# Patient Record
Sex: MALE | Race: WHITE | Employment: FULL TIME | URBAN - METROPOLITAN AREA
[De-identification: names, ages, dates, MRNs, and addresses within clinical notes are randomized per-mention and may not be internally consistent; named-entity substitution may affect disease eponyms.]

---

## 2018-03-22 ENCOUNTER — HOSPITAL ENCOUNTER (OUTPATIENT)
Dept: OTHER | Age: 23
Discharge: HOME | End: 2018-03-22
Attending: SURGERY | Admitting: SURGERY

## 2018-03-22 ENCOUNTER — OFFICE VISIT (OUTPATIENT)
Dept: SURGERY | Age: 23
End: 2018-03-22

## 2018-03-22 VITALS
HEIGHT: 70 IN | HEART RATE: 70 BPM | DIASTOLIC BLOOD PRESSURE: 72 MMHG | BODY MASS INDEX: 30.06 KG/M2 | SYSTOLIC BLOOD PRESSURE: 116 MMHG | WEIGHT: 210 LBS

## 2018-03-22 DIAGNOSIS — L05.01 PILONIDAL CYST WITH ABSCESS: Primary | ICD-10-CM

## 2018-03-22 PROCEDURE — 10080 I&D PILONIDAL CYST SIMPLE: CPT | Performed by: SURGERY

## 2018-03-22 RX ORDER — SULFAMETHOXAZOLE AND TRIMETHOPRIM 800; 160 MG/1; MG/1
1 TABLET ORAL 2 TIMES DAILY
COMMUNITY
Start: 2018-03-20 | End: 2019-10-08

## 2018-03-22 NOTE — PROGRESS NOTES
SUBJECTIVE:  HPI:   Abscess: Patient presents for evaluation of a cutaneous abscess. Lesion is located in the perirectal region. Onset was 2 weeks ago. Symptoms have gradually worsened. Abscess has associated symptoms of spontaneous drainage. Patient does have previous history of cutaneous abscesses. Pt does not have previous history of MRSA. States that is burst last night 3/21/18. He visited ER on 3/19/18, Started on Bactrim 3/20/18. CT obtained  CT:  Impression   1. Abscess seen measuring 2 cm in diameter with surrounding inflammatory   changes in the posterior soft tissues of the perineum posterior to the anus. No fistula demonstrated   2. Otherwise, unremarkable CT of the abdomen and pelvis             Thoroughly reviewed the patient's medical history, family history, social history and review of systems with the patient today in the office. Please see medical record for pertinent positives. Past Medical History:   Diagnosis Date    Asthma     Hypospadias       Past Surgical History:   Procedure Laterality Date    HYPOSPADIAS CORRECTION       Current Outpatient Prescriptions   Medication Sig Dispense Refill    sulfamethoxazole-trimethoprim (BACTRIM DS) 800-160 MG per tablet Take 1 tablet by mouth 2 times daily      clindamycin (CLEOCIN) 150 MG capsule Take 3 capsules by mouth 3 times daily for 10 days 90 capsule 0    polyethylene glycol (MIRALAX) packet Take 17 g by mouth daily as needed for Other (Constipation) 527 g 1    Probiotic CAPS Take 1 capsule by mouth every 12 hours as needed (antibiotic use) 30 capsule 0    albuterol-ipratropium (COMBIVENT)  MCG/ACT inhaler Inhale 2 puffs into the lungs every 6 hours as needed.  naproxen (NAPROSYN) 500 MG tablet Take 1 tablet by mouth 2 times daily for 10 days 20 tablet 0     No current facility-administered medications for this visit.         Allergies   Allergen Reactions    Promethazine-Phenylephrine Rash and Other (See Comments) Pt unaware of reaction to med; pts mother told him he is allergic. Review of Systems:       Review of Systems   Constitutional: Negative for chills and fever. HENT: Negative for ear pain, mouth sores, sore throat and tinnitus. Eyes: Negative for photophobia, redness and itching. Respiratory: Negative for apnea, choking and stridor. Cardiovascular: Negative for chest pain and palpitations. Gastrointestinal: Negative for anal bleeding, constipation and rectal pain. Endocrine: Negative for polydipsia. Genitourinary: Negative for enuresis, flank pain and hematuria. Musculoskeletal: Negative for back pain, joint swelling and myalgias. Skin: Negative for color change and pallor. Allergic/Immunologic: Negative for environmental allergies. Neurological: Negative for syncope and speech difficulty. Psychiatric/Behavioral: Negative for confusion and hallucinations. OBJECTIVE:  Physical Exam:    /72 (Site: Right Arm, Position: Standing, Cuff Size: Medium Adult)   Pulse 70   Ht 5' 10\" (1.778 m)   Wt 210 lb (95.3 kg)   BMI 30.13 kg/m²      Physical Exam   Constitutional: He is oriented to person, place, and time. He appears well-developed and well-nourished. HENT:   Head: Normocephalic. Eyes: Pupils are equal, round, and reactive to light. Neck: Normal range of motion. Neck supple. Cardiovascular: Normal rate. Pulmonary/Chest: Effort normal.   Abdominal: Soft. He exhibits no distension and no mass. There is no tenderness. There is no rebound and no guarding. Musculoskeletal: Normal range of motion. Back:    Neurological: He is alert and oriented to person, place, and time. Skin: Skin is warm. Psychiatric: He has a normal mood and affect. ASSESSMENT:  1. Pilonidal cyst with abscess          PLAN:  Treatment: Patient counseled on risks, benefits, and alternatives of treatment plan at length while in the office today.  Patient states an understanding and willingness to proceed with plan. Incision and Drainage Procedure Note    Pre-op Dx: Abscess of coccyx. Post-op Dx: Same   Procedure: Incision and drainage of abscess of coccyx. Surgeon: Amanda Donovan MD  Anesthesia: Lidocaine 1% without epinephrine  Complications: None   Indications: See note     Informed verbal consent obtained. Local anesthetic with 1% lidocaine. Abscess incised with No. 11 blade. Copious pus and caseous material removed. Cavity irrigated with betadine. Packed using 1/4\" iodoform gauze. Covered with dry dressing. Culture sent to lab. Wound care instructed. Patient to pack abscess cavity daily with 1/4\" iodoform gauze and cover with dry gauze dressing. No orders of the defined types were placed in this encounter. No orders of the defined types were placed in this encounter. Follow Up:  No Follow-up on file.       Amanda Donovan MD      3/22/18

## 2018-03-23 ASSESSMENT — ENCOUNTER SYMPTOMS
COLOR CHANGE: 0
SORE THROAT: 0
PHOTOPHOBIA: 0
EYE REDNESS: 0
BACK PAIN: 0
APNEA: 0
EYE ITCHING: 0
RECTAL PAIN: 0
STRIDOR: 0
CONSTIPATION: 0
CHOKING: 0
ANAL BLEEDING: 0

## 2018-03-31 LAB
CULTURE: NORMAL
REPORT STATUS: NORMAL
REQUEST PROBLEM: NORMAL
SPECIMEN: NORMAL

## 2019-05-26 ENCOUNTER — HOSPITAL ENCOUNTER (EMERGENCY)
Age: 24
Discharge: HOME | End: 2019-05-26
Attending: EMERGENCY MEDICINE
Payer: COMMERCIAL

## 2019-05-26 ENCOUNTER — APPOINTMENT (OUTPATIENT)
Dept: CT IMAGING | Age: 24
End: 2019-05-26
Payer: COMMERCIAL

## 2019-05-26 VITALS
OXYGEN SATURATION: 97 % | HEIGHT: 70 IN | DIASTOLIC BLOOD PRESSURE: 83 MMHG | WEIGHT: 210 LBS | RESPIRATION RATE: 18 BRPM | TEMPERATURE: 97.5 F | BODY MASS INDEX: 30.06 KG/M2 | SYSTOLIC BLOOD PRESSURE: 149 MMHG | HEART RATE: 112 BPM

## 2019-05-26 DIAGNOSIS — S01.511A LACERATION OF LOWER LIP, INITIAL ENCOUNTER: ICD-10-CM

## 2019-05-26 DIAGNOSIS — S02.2XXA CLOSED FRACTURE OF NASAL BONE, INITIAL ENCOUNTER: Primary | ICD-10-CM

## 2019-05-26 DIAGNOSIS — S05.11XA PERIORBITAL CONTUSION OF RIGHT EYE, INITIAL ENCOUNTER: ICD-10-CM

## 2019-05-26 DIAGNOSIS — S09.90XA CLOSED HEAD INJURY, INITIAL ENCOUNTER: ICD-10-CM

## 2019-05-26 PROCEDURE — 70450 CT HEAD/BRAIN W/O DYE: CPT

## 2019-05-26 PROCEDURE — 4500000027

## 2019-05-26 PROCEDURE — 72125 CT NECK SPINE W/O DYE: CPT

## 2019-05-26 PROCEDURE — 99284 EMERGENCY DEPT VISIT MOD MDM: CPT

## 2019-05-26 PROCEDURE — 2500000003 HC RX 250 WO HCPCS: Performed by: EMERGENCY MEDICINE

## 2019-05-26 PROCEDURE — 70486 CT MAXILLOFACIAL W/O DYE: CPT

## 2019-05-26 PROCEDURE — 6370000000 HC RX 637 (ALT 250 FOR IP): Performed by: EMERGENCY MEDICINE

## 2019-05-26 RX ORDER — PENICILLIN V POTASSIUM 500 MG/1
500 TABLET ORAL 4 TIMES DAILY
Qty: 40 TABLET | Refills: 0 | Status: SHIPPED | OUTPATIENT
Start: 2019-05-26 | End: 2019-06-05

## 2019-05-26 RX ORDER — ACETAMINOPHEN 500 MG
1000 TABLET ORAL ONCE
Status: COMPLETED | OUTPATIENT
Start: 2019-05-26 | End: 2019-05-26

## 2019-05-26 RX ORDER — ONDANSETRON 4 MG/1
4 TABLET, FILM COATED ORAL EVERY 8 HOURS PRN
Qty: 10 TABLET | Refills: 0 | Status: SHIPPED | OUTPATIENT
Start: 2019-05-26 | End: 2019-10-08

## 2019-05-26 RX ORDER — ONDANSETRON 4 MG/1
4 TABLET, ORALLY DISINTEGRATING ORAL ONCE
Status: COMPLETED | OUTPATIENT
Start: 2019-05-26 | End: 2019-05-26

## 2019-05-26 RX ADMIN — ACETAMINOPHEN 1000 MG: 500 TABLET ORAL at 07:14

## 2019-05-26 RX ADMIN — LIDOCAINE HYDROCHLORIDE 5 ML: 10 INJECTION, SOLUTION EPIDURAL; INFILTRATION; INTRACAUDAL; PERINEURAL at 06:11

## 2019-05-26 RX ADMIN — ONDANSETRON 4 MG: 4 TABLET, ORALLY DISINTEGRATING ORAL at 07:13

## 2019-05-26 ASSESSMENT — PAIN SCALES - GENERAL: PAINLEVEL_OUTOF10: 10
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
## 2019-05-26 NOTE — ED NOTES
Discharge education reviewed. Questions answered. Medications clarified. Belongings gathered. Discharge instructions signed. All belongings accounted for. Patient discharged to home via mother.       Hattie Martinez RN  05/26/19 5631

## 2019-10-08 ENCOUNTER — APPOINTMENT (OUTPATIENT)
Dept: GENERAL RADIOLOGY | Age: 24
End: 2019-10-08
Payer: COMMERCIAL

## 2019-10-08 ENCOUNTER — HOSPITAL ENCOUNTER (EMERGENCY)
Age: 24
Discharge: HOME | End: 2019-10-08
Attending: FAMILY MEDICINE
Payer: COMMERCIAL

## 2019-10-08 VITALS
DIASTOLIC BLOOD PRESSURE: 88 MMHG | HEIGHT: 70 IN | BODY MASS INDEX: 35.22 KG/M2 | TEMPERATURE: 98.3 F | WEIGHT: 246 LBS | SYSTOLIC BLOOD PRESSURE: 142 MMHG | OXYGEN SATURATION: 98 % | HEART RATE: 108 BPM | RESPIRATION RATE: 18 BRPM

## 2019-10-08 DIAGNOSIS — J06.9 VIRAL URI: ICD-10-CM

## 2019-10-08 DIAGNOSIS — J45.21 MILD INTERMITTENT REACTIVE AIRWAY DISEASE WITH ACUTE EXACERBATION: Primary | ICD-10-CM

## 2019-10-08 LAB
ALBUMIN SERPL-MCNC: 4.5 GM/DL (ref 3.4–5)
ALP BLD-CCNC: 101 IU/L (ref 40–128)
ALT SERPL-CCNC: 29 U/L (ref 10–40)
ANION GAP SERPL CALCULATED.3IONS-SCNC: 12 MMOL/L (ref 4–16)
AST SERPL-CCNC: 19 IU/L (ref 15–37)
BASOPHILS ABSOLUTE: 0 K/CU MM
BASOPHILS RELATIVE PERCENT: 0.4 % (ref 0–1)
BILIRUB SERPL-MCNC: 0.3 MG/DL (ref 0–1)
BUN BLDV-MCNC: 12 MG/DL (ref 6–23)
CALCIUM SERPL-MCNC: 9.2 MG/DL (ref 8.3–10.6)
CHLORIDE BLD-SCNC: 102 MMOL/L (ref 99–110)
CO2: 25 MMOL/L (ref 21–32)
CREAT SERPL-MCNC: 0.8 MG/DL (ref 0.9–1.3)
DIFFERENTIAL TYPE: ABNORMAL
EOSINOPHILS ABSOLUTE: 0.3 K/CU MM
EOSINOPHILS RELATIVE PERCENT: 3.1 % (ref 0–3)
GFR AFRICAN AMERICAN: >60 ML/MIN/1.73M2
GFR NON-AFRICAN AMERICAN: >60 ML/MIN/1.73M2
GLUCOSE BLD-MCNC: 104 MG/DL (ref 70–99)
HCT VFR BLD CALC: 43.8 % (ref 42–52)
HEMOGLOBIN: 14.3 GM/DL (ref 13.5–18)
IMMATURE NEUTROPHIL %: 0.3 % (ref 0–0.43)
LYMPHOCYTES ABSOLUTE: 2.9 K/CU MM
LYMPHOCYTES RELATIVE PERCENT: 26.1 % (ref 24–44)
MCH RBC QN AUTO: 31.2 PG (ref 27–31)
MCHC RBC AUTO-ENTMCNC: 32.6 % (ref 32–36)
MCV RBC AUTO: 95.6 FL (ref 78–100)
MONOCYTES ABSOLUTE: 1 K/CU MM
MONOCYTES RELATIVE PERCENT: 9.4 % (ref 0–4)
NUCLEATED RBC %: 0 %
PDW BLD-RTO: 12.6 % (ref 11.7–14.9)
PLATELET # BLD: 173 K/CU MM (ref 140–440)
PMV BLD AUTO: 10.6 FL (ref 7.5–11.1)
POTASSIUM SERPL-SCNC: 3.2 MMOL/L (ref 3.5–5.1)
RBC # BLD: 4.58 M/CU MM (ref 4.6–6.2)
SEGMENTED NEUTROPHILS ABSOLUTE COUNT: 6.7 K/CU MM
SEGMENTED NEUTROPHILS RELATIVE PERCENT: 60.7 % (ref 36–66)
SODIUM BLD-SCNC: 139 MMOL/L (ref 135–145)
TOTAL IMMATURE NEUTOROPHIL: 0.03 K/CU MM
TOTAL NUCLEATED RBC: 0 K/CU MM
TOTAL PROTEIN: 7.6 GM/DL (ref 6.4–8.2)
TROPONIN T: <0.01 NG/ML
WBC # BLD: 11 K/CU MM (ref 4–10.5)

## 2019-10-08 PROCEDURE — 93005 ELECTROCARDIOGRAM TRACING: CPT | Performed by: FAMILY MEDICINE

## 2019-10-08 PROCEDURE — 85025 COMPLETE CBC W/AUTO DIFF WBC: CPT

## 2019-10-08 PROCEDURE — 6370000000 HC RX 637 (ALT 250 FOR IP): Performed by: FAMILY MEDICINE

## 2019-10-08 PROCEDURE — 71046 X-RAY EXAM CHEST 2 VIEWS: CPT

## 2019-10-08 PROCEDURE — 6360000002 HC RX W HCPCS: Performed by: FAMILY MEDICINE

## 2019-10-08 PROCEDURE — 96366 THER/PROPH/DIAG IV INF ADDON: CPT

## 2019-10-08 PROCEDURE — 94640 AIRWAY INHALATION TREATMENT: CPT

## 2019-10-08 PROCEDURE — 93010 ELECTROCARDIOGRAM REPORT: CPT | Performed by: INTERNAL MEDICINE

## 2019-10-08 PROCEDURE — 96375 TX/PRO/DX INJ NEW DRUG ADDON: CPT

## 2019-10-08 PROCEDURE — 2580000003 HC RX 258: Performed by: FAMILY MEDICINE

## 2019-10-08 PROCEDURE — 99284 EMERGENCY DEPT VISIT MOD MDM: CPT

## 2019-10-08 PROCEDURE — 84484 ASSAY OF TROPONIN QUANT: CPT

## 2019-10-08 PROCEDURE — 96365 THER/PROPH/DIAG IV INF INIT: CPT

## 2019-10-08 PROCEDURE — 80053 COMPREHEN METABOLIC PANEL: CPT

## 2019-10-08 RX ORDER — NAPROXEN 500 MG/1
500 TABLET ORAL 2 TIMES DAILY
Qty: 20 TABLET | Refills: 0 | Status: SHIPPED | OUTPATIENT
Start: 2019-10-08 | End: 2019-10-18

## 2019-10-08 RX ORDER — PREDNISONE 20 MG/1
40 TABLET ORAL DAILY
Qty: 10 TABLET | Refills: 0 | Status: SHIPPED | OUTPATIENT
Start: 2019-10-08 | End: 2019-10-13

## 2019-10-08 RX ORDER — MAGNESIUM SULFATE IN WATER 40 MG/ML
2 INJECTION, SOLUTION INTRAVENOUS ONCE
Status: COMPLETED | OUTPATIENT
Start: 2019-10-08 | End: 2019-10-08

## 2019-10-08 RX ORDER — DEXAMETHASONE SODIUM PHOSPHATE 10 MG/ML
10 INJECTION, SOLUTION INTRAMUSCULAR; INTRAVENOUS ONCE
Status: COMPLETED | OUTPATIENT
Start: 2019-10-08 | End: 2019-10-08

## 2019-10-08 RX ORDER — 0.9 % SODIUM CHLORIDE 0.9 %
1000 INTRAVENOUS SOLUTION INTRAVENOUS ONCE
Status: COMPLETED | OUTPATIENT
Start: 2019-10-08 | End: 2019-10-08

## 2019-10-08 RX ORDER — ALBUTEROL SULFATE 90 UG/1
2 AEROSOL, METERED RESPIRATORY (INHALATION) EVERY 4 HOURS PRN
Qty: 1 INHALER | Refills: 0 | Status: SHIPPED | OUTPATIENT
Start: 2019-10-08 | End: 2019-10-15

## 2019-10-08 RX ORDER — IPRATROPIUM BROMIDE AND ALBUTEROL SULFATE 2.5; .5 MG/3ML; MG/3ML
3 SOLUTION RESPIRATORY (INHALATION) ONCE
Status: COMPLETED | OUTPATIENT
Start: 2019-10-08 | End: 2019-10-08

## 2019-10-08 RX ORDER — OXYMETAZOLINE HYDROCHLORIDE 0.05 G/100ML
3 SPRAY NASAL ONCE
Status: COMPLETED | OUTPATIENT
Start: 2019-10-08 | End: 2019-10-08

## 2019-10-08 RX ADMIN — IPRATROPIUM BROMIDE AND ALBUTEROL SULFATE 3 AMPULE: .5; 3 SOLUTION RESPIRATORY (INHALATION) at 01:00

## 2019-10-08 RX ADMIN — DEXAMETHASONE SODIUM PHOSPHATE 10 MG: 10 INJECTION, SOLUTION INTRAMUSCULAR; INTRAVENOUS at 02:10

## 2019-10-08 RX ADMIN — SODIUM CHLORIDE 1000 ML: 9 INJECTION, SOLUTION INTRAVENOUS at 02:05

## 2019-10-08 RX ADMIN — OXYMETAZOLINE HCL 3 SPRAY: 0.05 SPRAY NASAL at 02:10

## 2019-10-08 RX ADMIN — MAGNESIUM SULFATE 2 G: 2 INJECTION INTRAVENOUS at 02:10

## 2019-10-08 ASSESSMENT — PAIN DESCRIPTION - PAIN TYPE: TYPE: ACUTE PAIN

## 2019-10-08 ASSESSMENT — PAIN SCALES - GENERAL: PAINLEVEL_OUTOF10: 7

## 2019-10-08 ASSESSMENT — PAIN DESCRIPTION - LOCATION: LOCATION: CHEST

## 2019-10-09 ENCOUNTER — HOSPITAL ENCOUNTER (EMERGENCY)
Age: 24
Discharge: HOME | End: 2019-10-09
Attending: EMERGENCY MEDICINE
Payer: COMMERCIAL

## 2019-10-09 VITALS
WEIGHT: 245 LBS | HEIGHT: 70 IN | OXYGEN SATURATION: 96 % | TEMPERATURE: 98.1 F | BODY MASS INDEX: 35.07 KG/M2 | HEART RATE: 88 BPM | DIASTOLIC BLOOD PRESSURE: 82 MMHG | SYSTOLIC BLOOD PRESSURE: 121 MMHG | RESPIRATION RATE: 16 BRPM

## 2019-10-09 DIAGNOSIS — R05.9 COUGH: Primary | ICD-10-CM

## 2019-10-09 PROCEDURE — 6370000000 HC RX 637 (ALT 250 FOR IP): Performed by: EMERGENCY MEDICINE

## 2019-10-09 PROCEDURE — 99283 EMERGENCY DEPT VISIT LOW MDM: CPT

## 2019-10-09 RX ORDER — BENZONATATE 100 MG/1
100 CAPSULE ORAL 3 TIMES DAILY PRN
Qty: 9 CAPSULE | Refills: 0 | Status: SHIPPED | OUTPATIENT
Start: 2019-10-09 | End: 2019-10-16

## 2019-10-09 RX ORDER — METHYLPREDNISOLONE 4 MG/1
TABLET ORAL
Qty: 1 KIT | Refills: 0 | Status: SHIPPED | OUTPATIENT
Start: 2019-10-09

## 2019-10-09 RX ORDER — DIPHENHYDRAMINE HCL 25 MG
25 CAPSULE ORAL ONCE
Status: COMPLETED | OUTPATIENT
Start: 2019-10-09 | End: 2019-10-09

## 2019-10-09 RX ORDER — PREDNISONE 20 MG/1
60 TABLET ORAL ONCE
Status: COMPLETED | OUTPATIENT
Start: 2019-10-09 | End: 2019-10-09

## 2019-10-09 RX ORDER — BENZONATATE 100 MG/1
100 CAPSULE ORAL ONCE
Status: COMPLETED | OUTPATIENT
Start: 2019-10-09 | End: 2019-10-09

## 2019-10-09 RX ORDER — BROMPHENIRAMINE MALEATE, PSEUDOEPHEDRINE HYDROCHLORIDE, AND DEXTROMETHORPHAN HYDROBROMIDE 2; 30; 10 MG/5ML; MG/5ML; MG/5ML
5 SYRUP ORAL 4 TIMES DAILY PRN
Qty: 1 BOTTLE | Refills: 0 | Status: SHIPPED | OUTPATIENT
Start: 2019-10-09

## 2019-10-09 RX ADMIN — BENZONATATE 100 MG: 100 CAPSULE ORAL at 23:31

## 2019-10-09 RX ADMIN — DIPHENHYDRAMINE HYDROCHLORIDE 25 MG: 25 CAPSULE ORAL at 23:31

## 2019-10-09 RX ADMIN — PREDNISONE 60 MG: 20 TABLET ORAL at 23:31

## 2019-10-09 ASSESSMENT — ENCOUNTER SYMPTOMS
SHORTNESS OF BREATH: 0
RHINORRHEA: 1
SINUS CONGESTION: 1
GASTROINTESTINAL NEGATIVE: 1
WHEEZING: 0
COUGH: 1
EYES NEGATIVE: 1

## 2019-10-09 ASSESSMENT — PAIN SCALES - GENERAL: PAINLEVEL_OUTOF10: 7

## 2019-10-09 ASSESSMENT — PAIN DESCRIPTION - LOCATION: LOCATION: CHEST

## 2019-10-09 ASSESSMENT — PAIN DESCRIPTION - PAIN TYPE: TYPE: ACUTE PAIN

## 2019-10-11 LAB
EKG ATRIAL RATE: 101 BPM
EKG DIAGNOSIS: NORMAL
EKG P AXIS: 51 DEGREES
EKG P-R INTERVAL: 180 MS
EKG Q-T INTERVAL: 344 MS
EKG QRS DURATION: 92 MS
EKG QTC CALCULATION (BAZETT): 446 MS
EKG R AXIS: 68 DEGREES
EKG T AXIS: 15 DEGREES
EKG VENTRICULAR RATE: 101 BPM

## 2020-10-15 ENCOUNTER — APPOINTMENT (OUTPATIENT)
Dept: GENERAL RADIOLOGY | Age: 25
End: 2020-10-15

## 2020-10-15 ENCOUNTER — HOSPITAL ENCOUNTER (EMERGENCY)
Age: 25
Discharge: LEFT BEFORE DISCHARGE | End: 2020-10-15

## 2020-10-15 VITALS
BODY MASS INDEX: 32.93 KG/M2 | SYSTOLIC BLOOD PRESSURE: 113 MMHG | DIASTOLIC BLOOD PRESSURE: 91 MMHG | RESPIRATION RATE: 16 BRPM | HEART RATE: 70 BPM | OXYGEN SATURATION: 100 % | WEIGHT: 230 LBS | HEIGHT: 70 IN | TEMPERATURE: 97.7 F

## 2020-10-15 PROCEDURE — 71046 X-RAY EXAM CHEST 2 VIEWS: CPT

## 2020-10-15 PROCEDURE — 99281 EMR DPT VST MAYX REQ PHY/QHP: CPT

## 2020-10-15 RX ORDER — ALBUTEROL SULFATE 0.63 MG/3ML
1 SOLUTION RESPIRATORY (INHALATION) EVERY 6 HOURS PRN
COMMUNITY

## 2020-10-15 RX ORDER — GUAIFENESIN 100 MG/5ML
200 SOLUTION ORAL EVERY 4 HOURS PRN
Status: DISCONTINUED | OUTPATIENT
Start: 2020-10-15 | End: 2020-10-15 | Stop reason: HOSPADM

## 2020-10-15 RX ORDER — PREDNISONE 20 MG/1
60 TABLET ORAL ONCE
Status: DISCONTINUED | OUTPATIENT
Start: 2020-10-15 | End: 2020-10-15 | Stop reason: HOSPADM

## 2020-10-15 RX ORDER — AMOXICILLIN 250 MG/1
250 CAPSULE ORAL 3 TIMES DAILY
COMMUNITY

## 2020-10-15 ASSESSMENT — PAIN DESCRIPTION - DESCRIPTORS: DESCRIPTORS: DISCOMFORT

## 2020-10-15 ASSESSMENT — PAIN DESCRIPTION - LOCATION: LOCATION: CHEST

## 2020-10-15 ASSESSMENT — PAIN DESCRIPTION - PAIN TYPE: TYPE: ACUTE PAIN

## 2020-10-15 ASSESSMENT — PAIN SCALES - GENERAL: PAINLEVEL_OUTOF10: 6

## 2020-10-15 NOTE — ED TRIAGE NOTES
Pt states \"he woke up not being able to breath. Feels short of breath with constant pressure in his chest\". Pain is 5/10. Pt states that he took two breathing treatments this morning when he woke up. Used albuterol inhaler 4 times.

## 2020-10-15 NOTE — ED PROVIDER NOTES
As physician-in-triage, I performed a medical screening history and physical exam on this patient. HISTORY OF PRESENT ILLNESS  James Rider is a 31 y.o. male presents to the emergency department shortness of breath and cough since this morning. States is a nonproductive cough. States he has a tightness in his chest.  States he is a smoker and has a history of asthma. Uses albuterol inhaler and nebulizer. States he is in this morning without relief. No known Covid exposure. No fevers or chills. No diaphoresis. No syncope. No known cardiac disease. PHYSICAL EXAM  BP (!) 113/91   Pulse 70   Temp 97.7 °F (36.5 °C) (Oral)   Resp 16   Ht 5' 10\" (1.778 m)   Wt 230 lb (104.3 kg)   SpO2 100%   BMI 33.00 kg/m²     On exam, the patient appears in no acute distress. Speech is clear. Breathing is unlabored. Moves all extremities    Comment: Please note this report has been produced using speech recognition software and may contain errors related to that system including errors in grammar, punctuation, and spelling, as well as words and phrases that may be inappropriate. If there are any questions or concerns please feel free to contact the dictating provider for clarification.        Radha Atkinson MD  10/15/20 9742

## 2021-03-30 ENCOUNTER — APPOINTMENT (OUTPATIENT)
Dept: GENERAL RADIOLOGY | Age: 26
End: 2021-03-30

## 2021-03-30 ENCOUNTER — HOSPITAL ENCOUNTER (EMERGENCY)
Age: 26
Discharge: HOME | End: 2021-03-30

## 2021-03-30 VITALS
HEART RATE: 79 BPM | SYSTOLIC BLOOD PRESSURE: 152 MMHG | OXYGEN SATURATION: 99 % | RESPIRATION RATE: 17 BRPM | DIASTOLIC BLOOD PRESSURE: 72 MMHG | BODY MASS INDEX: 32.93 KG/M2 | TEMPERATURE: 97.7 F | WEIGHT: 230 LBS | HEIGHT: 70 IN

## 2021-03-30 DIAGNOSIS — R05.9 COUGH: Primary | ICD-10-CM

## 2021-03-30 DIAGNOSIS — M79.671 ACUTE FOOT PAIN, RIGHT: ICD-10-CM

## 2021-03-30 PROCEDURE — 99283 EMERGENCY DEPT VISIT LOW MDM: CPT

## 2021-03-30 PROCEDURE — 73630 X-RAY EXAM OF FOOT: CPT

## 2021-03-30 PROCEDURE — U0005 INFEC AGEN DETEC AMPLI PROBE: HCPCS

## 2021-03-30 PROCEDURE — U0003 INFECTIOUS AGENT DETECTION BY NUCLEIC ACID (DNA OR RNA); SEVERE ACUTE RESPIRATORY SYNDROME CORONAVIRUS 2 (SARS-COV-2) (CORONAVIRUS DISEASE [COVID-19]), AMPLIFIED PROBE TECHNIQUE, MAKING USE OF HIGH THROUGHPUT TECHNOLOGIES AS DESCRIBED BY CMS-2020-01-R: HCPCS

## 2021-03-30 PROCEDURE — 71046 X-RAY EXAM CHEST 2 VIEWS: CPT

## 2021-03-30 RX ORDER — ALBUTEROL SULFATE 90 UG/1
2 AEROSOL, METERED RESPIRATORY (INHALATION) EVERY 6 HOURS PRN
Qty: 1 INHALER | Refills: 0 | Status: SHIPPED | OUTPATIENT
Start: 2021-03-30

## 2021-03-30 NOTE — LETTER
[facility] Emergency Department  [address] [address]  Phone: [phone]  Fax: [phone]               March 30, 2021    Patient: Lillian Mallory   YOB: 1995   Date of Visit: 3/30/2021       To Whom It May Concern:    Iman López was seen and treated in our emergency department on 3/30/2021. He may return to work pending COVID-19 test.  If test is negative he may return to work immediately. If test is positive- he is not return to work until symptoms have improved, he is without fever for 24 hours without usage of ibuprofen or acetaminophen, and it must be after April 5, 2021.     Sincerely,       Cristobal Medina PA-C         Signature:__________________________________

## 2021-03-31 ENCOUNTER — CARE COORDINATION (OUTPATIENT)
Dept: CARE COORDINATION | Age: 26
End: 2021-03-31

## 2021-03-31 LAB
SARS-COV-2: NOT DETECTED
SOURCE: NORMAL

## 2021-03-31 NOTE — CARE COORDINATION
[facility] department [address]: ([phone]) and PCP office for questions related to their healthcare. LPN CC provided contact information for future needs. Reviewed and educated patient on any new and changed medications related to discharge diagnosis     Was patient discharged with a pulse oximeter? No Discussed and confirmed pulse oximeter discharge instructions and when to notify provider or seek emergency care. Patient/family/caregiver given information for [employer] and agrees to enroll no  Patient's preferred e-mail: na   Patient's preferred phone number: see chart  Based on Loop alert triggers, patient will be contacted by nurse care manager for worsening symptoms. Plan for follow-up call in 5-7 days based on severity of symptoms and risk factors.

## 2021-03-31 NOTE — ED PROVIDER NOTES
EMERGENCY DEPARTMENT ENCOUNTER      PCP: No primary care provider on file. CHIEF COMPLAINT    Chief Complaint   Patient presents with    Foot Pain     right       This patient was not evaluated by the attending physician. I have independently evaluated this patient. My supervising physician was available for consultation. HPI    James Pathak is a 31 y.o. male who presents with nonproductive cough for the past 4 days and \"knot\" on the bottom of his right foot that has been present for 2 to 3 months. Patient reports he came today because of his cough but wanted to have his foot checked out while he is here because he stepped on a nail a few months ago and removed it himself and attempted to be seen in the ED at that time but left due to wait times being long. Patient reports his cough started after he cleaned out a stagnant trailer that has mouse feces and lots of dust. He reports he has asthma and this usually triggers his asthma. He feels this is his asthma flaring up. He does report subjective fever last night and feels fatigued. Patient reports occasional wheezing and shortness of breath. He does have an albuterol inhaler to use at home but isn't sure how much is left of it as he does not use it regularly. Patient denies any known recent contacts that have tested positive for COVID-19 or recent sick contacts. Patient denies history of PE/DVT. Patient denies nasal congestion, rhinorrhea, sore throat, ear pain, eye pain, drainage from ears or eyes, difficulty swallowing, hemoptysis, chest pain. REVIEW OF SYSTEMS    Constitutional:  + fever  HEENT:  See HPI  Cardiovascular:  Denies chest pain, palpitations.   Respiratory:  See HPI  GI:  Denies abdominal pain, vomiting, or diarrhea   Neurologic:  Denies confusion, light headedness, dizziness, or syncope   Skin:  No rash  Musculoskeletal: + right foot pain; Denies back pain    All other review of systems are negative  See HPI and nursing notes for additional information       PAST MEDICAL AND SURGICAL HISTORY    Past Medical History:   Diagnosis Date    Asthma      History reviewed. No pertinent surgical history. CURRENT MEDICATIONS    Current Outpatient Rx   Medication Sig Dispense Refill    albuterol sulfate  (90 Base) MCG/ACT inhaler Inhale 2 puffs into the lungs every 6 hours as needed for Wheezing or Shortness of Breath (or cough) Please include spacer with instructions for use. 1 Inhaler 0    albuterol (ACCUNEB) 0.63 MG/3ML nebulizer solution Take 1 ampule by nebulization every 6 hours as needed for Wheezing      amoxicillin (AMOXIL) 250 MG capsule Take 250 mg by mouth 3 times daily         ALLERGIES    No Known Allergies    FAMILY AND SOCIAL HISTORY    History reviewed. No pertinent family history. Social History     Socioeconomic History    Marital status: Unknown     Spouse name: None    Number of children: None    Years of education: None    Highest education level: None   Occupational History    None   Social Needs    Financial resource strain: None    Food insecurity     Worry: None     Inability: None    Transportation needs     Medical: None     Non-medical: None   Tobacco Use    Smoking status: Current Every Day Smoker     Packs/day: 2.00     Years: 11.00     Pack years: 22.00     Types: Cigarettes    Smokeless tobacco: Never Used   Substance and Sexual Activity    Alcohol use:  Yes     Alcohol/week: 4.0 standard drinks     Types: 4 Cans of beer per week     Comment: per day     Drug use: Never    Sexual activity: Yes   Lifestyle    Physical activity     Days per week: None     Minutes per session: None    Stress: None   Relationships    Social connections     Talks on phone: None     Gets together: None     Attends Scientologist service: None     Active member of club or organization: None     Attends meetings of clubs or organizations: None     Relationship status: None    Intimate partner violence     Fear of current or ex partner: None     Emotionally abused: None     Physically abused: None     Forced sexual activity: None   Other Topics Concern    None   Social History Narrative    None       PHYSICAL EXAM    VITAL SIGNS: /86   Pulse 109   Temp 97.7 °F (36.5 °C) (Oral)   Resp 16   Ht 5' 10\" (1.778 m)   Wt 230 lb (104.3 kg)   SpO2 97%   BMI 33.00 kg/m²   Constitutional:  Well developed, well nourished, no acute distress, non-toxic appearance   Eyes: Conjunctiva normal, sclera non-icteric  HEENT:     - Normocephalic, atraumatic   - EOM intact. Conjunctiva normal    - External ears and nose normal.   - External auditory canals clear   - TMs clear without discharge, fluid, or bulging. Neck/Lymphatics:    - supple, no JVD     Respiratory:    Clear to auscultation in bilateral lung fields, no wheezes/rales/rhonchi. No retractions, no accessory muscle use  Cardiovascular:  Normal rate, normal rhythm   GI:  No distension  Musculoskeletal:  No obvious deficits, no edema. Right foot: Without gross deformity, discoloration. There is tenderness to palpation over callus formation of plantar aspect of foot between 3th and 4th metatarsal region with mild edema present. No other edema or TTP of right foot is present. No TTP of toes of right foot. Right ankle without gross deformity, swelling, discoloration, TTP. Full AROM without pain. Integument:  Skin pink, warm, dry, intact       LABS:  COVID test    RADIOLOGY/PROCEDURES    XR FOOT RIGHT (MIN 3 VIEWS)   Final Result   No acute osseous abnormality. XR CHEST (2 VW)   Final Result   No acute process. ED COURSE & MEDICAL DECISION MAKING       Vital signs and nursing notes reviewed during ED course. I have independently evaluated this patient. Supervising physician present in the Emergency Department, available for consultation, throughout entirety of  patient care.   All pertinent Lab data and radiographic results reviewed with patient at bedside. Patient presents as above which prompted work up today with COVID-19 test which is in process and xrays of chest and right foot. Chest xray obtained today and reveals no acute cardiopulmonary process. No pneumothorax, no consolidation concerning for pneumonia, no pleural effusion. Right foot xray without acute osseous abnormality. Suspect Doran's neuroma as cause of right foot pain and recommend changing to more padded new shoes and follow up with podiatry. Discussed likely viral etiology of cough/fever and recommended COVID-19 isolation precautions until test results- patient voices understanding. I did don/doff appropriate PPE (including N95 mask, safety glasses, gown, gloves), as recommended by the health facility/national standard best practice, during my bedside interactions with the patient. COVID19 testing in progress. I have explained to the patient that their condition may change rapidly and have given them strict return precautions. In addition, they are given instructions regarding appropriate symptomatic care at home and instructions on how to minimize spread of the infection. I prescribed patient albuterol inhaler- we discussed medication. Recommended ibuprofen for right foot pain. I have low clinical suspicion for cellulitis, abscess, occult pneumonia, PE. Patient is comfortable with discharge at this time. Patient is nontoxic appearing. Vital signs stable. Patient is stable for outpatient management. The patient and/or the family were informed of the results of any tests/labs/imaging, the treatment plan, and time was allotted to answer questions. Diagnosis, disposition, and plan discussed in detail with patient who understands and agrees. Patient understands and agrees to follow up with podiatrist for recheck in 2-3 days and with [facility] resource center for recheck in 2 days for cough.  Patient understands and agrees to return to emergency department if symptoms worsen or any new symptoms develop- strict return precautions given. Clinical  IMPRESSION    1. Cough    2. Acute foot pain, right           Disposition referral (if applicable):  Swapnil Ma, [address] [facility] [address] [phone]    Call today  Recheck in 2-3 days    [facility]  [address]  [address] [address]  [phone]  Call today  [address] in 2 days for cough    [facility] Emergency Department  [facility] [id number] [phone]  Go to   Return to ED if symptoms worsen or new symptoms      Disposition medications (if applicable):  New Prescriptions    ALBUTEROL SULFATE  (90 BASE) MCG/ACT INHALER    Inhale 2 puffs into the lungs every 6 hours as needed for Wheezing or Shortness of Breath (or cough) Please include spacer with instructions for use. Comment: Please note this report has been produced using speech recognition software and may contain errors related to that system including errors in grammar, punctuation, and spelling, as well as words and phrases that may be inappropriate. If there are any questions or concerns please feel free to contact the dictating provider for clarification.         Cristobal Medina PA-C  04/01/21 0869

## 2021-03-31 NOTE — CARE COORDINATION
LP[facility] Care Coordinator attempted outreach. Unable to leave message and contact information because voicemail box in not setup yet.

## 2021-03-31 NOTE — ED NOTES
Discharge instructions reviewed with pt. All questions answered at this time. Pt verbalized understanding.       Aravind Albert RN  03/30/21 1445

## 2021-04-08 ENCOUNTER — CARE COORDINATION (OUTPATIENT)
Dept: CARE COORDINATION | Age: 26
End: 2021-04-08

## 2021-04-08 NOTE — CARE COORDINATION
[facility] attempted 1st week outreach. Unable to leave message because voice mailbox is not setup.

## 2021-04-16 ENCOUNTER — CARE COORDINATION (OUTPATIENT)
Dept: CARE COORDINATION | Age: 26
End: 2021-04-16

## 2021-04-16 NOTE — CARE COORDINATION
You Patient resolved from the Care Transitions episode on 3/30/21  Discussed COVID-19 related testing which was available at this time. Test results were negative. Patient informed of results, if available? Yes    Patient/family has been provided the following resources and education related to COVID-19:                         Signs, symptoms and red flags related to COVID-19            [facility] [place] exposure and quarantine guidelines            [facility] exposure contact - [phone]            Contact for their local Department of Health                 Patient currently reports that the following symptoms have improved:  reports some allergy symptoms of running nose . Patient reports that he went a had a pedicure done and it helped the knot (callus) at bottom of right foot. Patient reports doing well. No further outreach scheduled with this CTN/ACM. Episode of Care resolved. Patient has this CTN/ACM contact information if future needs arise.

## 2022-06-27 ENCOUNTER — APPOINTMENT (OUTPATIENT)
Dept: GENERAL RADIOLOGY | Age: 27
End: 2022-06-27

## 2022-06-27 ENCOUNTER — HOSPITAL ENCOUNTER (EMERGENCY)
Age: 27
Discharge: HOME | End: 2022-06-27

## 2022-06-27 VITALS
HEIGHT: 71 IN | SYSTOLIC BLOOD PRESSURE: 116 MMHG | DIASTOLIC BLOOD PRESSURE: 94 MMHG | TEMPERATURE: 98.4 F | BODY MASS INDEX: 31.5 KG/M2 | HEART RATE: 63 BPM | WEIGHT: 225 LBS | RESPIRATION RATE: 18 BRPM | OXYGEN SATURATION: 96 %

## 2022-06-27 DIAGNOSIS — M54.12 CERVICAL RADICULOPATHY: Primary | ICD-10-CM

## 2022-06-27 DIAGNOSIS — M25.511 ACUTE PAIN OF RIGHT SHOULDER: ICD-10-CM

## 2022-06-27 PROCEDURE — 96372 THER/PROPH/DIAG INJ SC/IM: CPT

## 2022-06-27 PROCEDURE — 6360000002 HC RX W HCPCS: Performed by: PHYSICIAN ASSISTANT

## 2022-06-27 PROCEDURE — 72050 X-RAY EXAM NECK SPINE 4/5VWS: CPT

## 2022-06-27 PROCEDURE — 73030 X-RAY EXAM OF SHOULDER: CPT

## 2022-06-27 PROCEDURE — 99284 EMERGENCY DEPT VISIT MOD MDM: CPT

## 2022-06-27 RX ORDER — METHYLPREDNISOLONE 4 MG/1
TABLET ORAL
Qty: 21 TABLET | Refills: 0 | Status: SHIPPED | OUTPATIENT
Start: 2022-06-27

## 2022-06-27 RX ORDER — METHOCARBAMOL 500 MG/1
500 TABLET, FILM COATED ORAL 3 TIMES DAILY
Qty: 15 TABLET | Refills: 0 | Status: SHIPPED | OUTPATIENT
Start: 2022-06-27 | End: 2022-07-02

## 2022-06-27 RX ORDER — ORPHENADRINE CITRATE 30 MG/ML
60 INJECTION INTRAMUSCULAR; INTRAVENOUS ONCE
Status: COMPLETED | OUTPATIENT
Start: 2022-06-27 | End: 2022-06-27

## 2022-06-27 RX ORDER — KETOROLAC TROMETHAMINE 30 MG/ML
30 INJECTION, SOLUTION INTRAMUSCULAR; INTRAVENOUS ONCE
Status: COMPLETED | OUTPATIENT
Start: 2022-06-27 | End: 2022-06-27

## 2022-06-27 RX ADMIN — ORPHENADRINE CITRATE 60 MG: 30 INJECTION INTRAMUSCULAR; INTRAVENOUS at 09:49

## 2022-06-27 RX ADMIN — KETOROLAC TROMETHAMINE 30 MG: 30 INJECTION, SOLUTION INTRAMUSCULAR; INTRAVENOUS at 09:50

## 2022-06-27 ASSESSMENT — PAIN - FUNCTIONAL ASSESSMENT: PAIN_FUNCTIONAL_ASSESSMENT: 0-10

## 2022-06-27 ASSESSMENT — PAIN SCALES - GENERAL
PAINLEVEL_OUTOF10: 7
PAINLEVEL_OUTOF10: 3
PAINLEVEL_OUTOF10: 8

## 2022-06-27 ASSESSMENT — PAIN DESCRIPTION - ORIENTATION
ORIENTATION: RIGHT

## 2022-06-27 ASSESSMENT — PAIN DESCRIPTION - DESCRIPTORS
DESCRIPTORS: STABBING
DESCRIPTORS: TIGHTNESS;ACHING
DESCRIPTORS: ACHING;TIGHTNESS

## 2022-06-27 ASSESSMENT — PAIN DESCRIPTION - LOCATION
LOCATION: ARM
LOCATION: ARM;SHOULDER
LOCATION: ARM;SHOULDER

## 2022-06-27 ASSESSMENT — PAIN DESCRIPTION - FREQUENCY
FREQUENCY: CONTINUOUS
FREQUENCY: CONTINUOUS

## 2022-06-27 ASSESSMENT — PAIN DESCRIPTION - PAIN TYPE
TYPE: CHRONIC PAIN
TYPE: CHRONIC PAIN

## 2022-06-27 ASSESSMENT — PAIN DESCRIPTION - ONSET: ONSET: ON-GOING

## 2022-06-27 NOTE — LETTER
[facility] Emergency Department  [address] [address]  Phone: [phone]  Fax: [phone]               June 27, 2022    Patient: Marylou Sarabia   YOB: 1995   Date of Visit: 6/27/2022       To Whom It May Concern:    Rickey Citizen was seen and treated in our emergency department on 6/27/2022. He may return to work on June 29, 2022.       Sincerely,       Melissa Fernandez RN         Signature:__________________________________

## 2022-06-27 NOTE — ED PROVIDER NOTES
eMERGENCY dEPARTMENT eNCOUnter         [address] [facility] ([place])    PCP: No primary care provider on file. CHIEF COMPLAINT    Chief Complaint   Patient presents with    Arm Injury     patient states about 3 months ago injured right arm, but the pain is now worse the past 3 days, left arm numbness sense       HPI    Jennifer Roa is a 31 y.o. male who presents with right-sided neck and shoulder pain. Onset was prior to arrival, x3 days ago. Context is patient states that he works as a , does a lot of overhead repetitive movements. Approximately 3 months ago, he was holding a 40 pound impact drill in his right hand when his arm suddenly gave out. States that he has arm dropped while carrying the heavy weight. Initially attributed this to \"a busy day and my arm getting tired. \"  Since then, he has had progressively worsening right anterolateral shoulder pain which is now rating to the right lateral neck. Pain worsens with work activities, shoulder movement especially lifting arm above head. Also noted some decreased strength of the right hand with repetitive movements. Denying any pain or tingling radiating to the right hand however yesterday while mowing, does state that his left arm did feel more numb. Denying any arm numbness today. No associated chest pain shortness of breath, nausea or vomiting. No fever or chills. No history of IV drug use. Has not been seen over the last 3 months for ongoing symptoms. No history of IV drug use or diabetes. Pain is a constant throbbing sharp pain, 7/10 throughout the anterolateral right shoulder, worse with any movement or palpation of the shoulder. No underlying history for cervical degenerative disc disease or neck pain or known carpal tunnel syndrome of the right upper arm. Patient is right-handed. Denies any other numbness tingling or weakness to the face, other extremities, abdomen.     REVIEW OF SYSTEMS    Constitutional:  Denies fever, chills, weight loss or weakness. Denies history of IVDA. Cardiovascular:  Denies chest pain, palpitations or swelling  Respiratory:  Denies cough or shortness of breath    GI:  Denies abdominal pain, nausea, vomiting, or diarrhea  :  Denies any urinary symptom   Musculoskeletal:  See HPI above   Skin:  Denies rash  Neurologic: See HPI  Endocrine:  Denies polyuria or polydypsia   Lymphatic:  Denies swollen glands     All other review of systems are negative  See HPI and nursing notes for additional information       PAST MEDICAL & SURGICAL HISTORY    Past Medical History:   Diagnosis Date    Asthma      History reviewed. No pertinent surgical history. CURRENT MEDICATIONS    Current Outpatient Rx   Medication Sig Dispense Refill    methocarbamol (ROBAXIN) 500 MG tablet Take 1 tablet by mouth 3 times daily for 5 days 15 tablet 0    methylPREDNISolone (MEDROL, ANTOINETTE,) 4 MG tablet Take by mouth as directed on package 21 tablet 0    albuterol sulfate  (90 Base) MCG/ACT inhaler Inhale 2 puffs into the lungs every 6 hours as needed for Wheezing or Shortness of Breath (or cough) Please include spacer with instructions for use.  1 Inhaler 0    albuterol (ACCUNEB) 0.63 MG/3ML nebulizer solution Take 1 ampule by nebulization every 6 hours as needed for Wheezing      amoxicillin (AMOXIL) 250 MG capsule Take 250 mg by mouth 3 times daily (Patient not taking: Reported on 6/27/2022)         ALLERGIES    Not on File    SOCIAL HISTORY    Social History     Socioeconomic History    Marital status: Unknown     Spouse name: None    Number of children: None    Years of education: None    Highest education level: None   Occupational History    None   Tobacco Use    Smoking status: Current Every Day Smoker     Packs/day: 2.00     Years: 11.00     Pack years: 22.00     Types: Cigarettes    Smokeless tobacco: Never Used   Vaping Use    Vaping Use: Former   Substance and Sexual Activity    Alcohol use: Yes     Alcohol/week: 4.0 standard drinks     Types: 4 Cans of beer per week     Comment: per day     Drug use: Never    Sexual activity: Yes   Other Topics Concern    None   Social History Narrative    None     Social Determinants of Health     Financial Resource Strain:     Difficulty of Paying Living Expenses: Not on file   Food Insecurity:     Worried About Running Out of Food in the Last Year: Not on file    Anahi of Food in the Last Year: Not on file   Transportation Needs:     Lack of Transportation (Medical): Not on file    Lack of Transportation (Non-Medical): Not on file   Physical Activity:     Days of Exercise per Week: Not on file    Minutes of Exercise per Session: Not on file   Stress:     Feeling of Stress : Not on file   Social Connections:     Frequency of Communication with Friends and Family: Not on file    Frequency of Social Gatherings with Friends and Family: Not on file    Attends Christianity Services: Not on file    Active Member of [address] [place] [address] or Organizations: Not on file    Attends Club or Organization Meetings: Not on file    Marital Status: Not on file   Intimate Partner Violence:     Fear of Current or Ex-Partner: Not on file    Emotionally Abused: Not on file    Physically Abused: Not on file    Sexually Abused: Not on file   Housing Stability:     Unable to Pay for Housing in the Last Year: Not on file    Number of [place] in the Last Year: Not on file    Unstable Housing in the Last Year: Not on file       PHYSICAL EXAM    VITAL SIGNS: BP (!) 116/94   Pulse 63   Temp 98.4 °F (36.9 °C) (Oral)   Resp 18   Ht 5' 11\" (1.803 m)   Wt 225 lb (102.1 kg)   SpO2 96%   BMI 31.38 kg/m²    Patient was noted to be afebrile    Constitutional:  Well developed, well nourished. In no acute distress  Head:  Atraumatic,  Normocephalic  Eyes:  No scleral icterus. Conjuctiva clear, No discharge  Neck: No JVD, supple.   No enlarged lymph nodes. Trachea midline  - No gross deformity, swelling, or discoloration.    - +mild to moderate right lower paracervical and suprascapular muscle tenderness without masses, fluctuance, warmth, or skin changes.  - No localized midline bony tenderness. No palpable step off, creptitus  - Spurlings test positive on right, negative on left  - ROM limited by pain, especially with right neck rotation and extension  Respiratory: Respirations nonlabored  Abdomen: Bowel sounds normal, Soft, No tenderness, no masses. Musculoskeletal:  No edema, no deformities. Right shoulder: No gross bony deformities. Negative sulcus sign. Tenderness over the anterolateral right shoulder, greatest over [facility] joint and anterior glenohumeral joint line over biceps insertion. No palpable joint redness or warmth or joint effusion. Limited range of motion of right shoulder especially abduction greater than 90 degrees, external rotation with positive impingement signs. Negative drop arm. Compartments are soft throughout the right upper extremity. No localized joint tenderness or joint effusion of right elbow or wrist.  Negative Tinel's and Phalen's. Radial pulse 2+. Brisk capillary refill. Sensation intact to light sharp touch throughout the bilateral upper arms. No wrist drop. 4/5  strength on the right, 5/5 on the left. Integument:  Well hydrated, no rash, no pallor. Neurologic:    - No obvious neurological deficits.   Patient moves without gross gait deficits  HIGH SENSITIVITY NEURO EXAM:  - Intact C1-4, Sensation back of head and neck, Equal throughout  - Intact C5 Deltoid motor, 5/5 bilaterally  - Intact C5-C6 Biceps Reflex, 2+ bilaterally  - Intact C6 Biceps motor, 5/5 bilaterally  - Intact C7 Extend Wrist/Fingers, 4/5 on right, 5/5 on left  - Intact C6-C7 Triceps Reflex, 2+ bilaterally  - Intact C8, Flex Fingers, 5/5 on right, 5/5 on left  - Intact T1, Move fingers apart, 5/5 bilaterally  - Intact T2-T12 Trunk Sensation, Equal throughout    Vascular:  Distal pulses and capillary refill intact bilateral upper extremities      RADIOLOGY/PROCEDURES            XR CERVICAL SPINE (4-5 VIEWS) (Preliminary result)  Result time 06/27/22 09:43:41  Preliminary result by Katharina Earl MD (06/27/22 09:43:41)                Impression:    1. No evidence of acute osseous abnormality. 2. No plain film evidence of significant degenerative/discogenic disease. Narrative:    EXAMINATION:   XRAY VIEWS OF THE CERVICAL SPINE     6/27/2022 9:18 am     COMPARISON:   Report of prior CT of the cervical spine 05/26/2019. HISTORY:   ORDERING SYSTEM PROVIDED HISTORY: neck pain x3 months   TECHNOLOGIST PROVIDED HISTORY:   Reason for exam:->neck pain x3 months   Reason for Exam: neck pain x3 months   Additional signs and symptoms: neck pain x3 months   Relevant Medical/Surgical History: neck pain x3 months     FINDINGS:   Open-mouth odontoid view is suboptimal.  Alignment of the cervical spine is   within normal limits.  Prevertebral soft tissues are unremarkable.  Cervical   vertebral body heights are well maintained.  No significant disc space   pathology is identified.  No significant neural foraminal narrowing is   identified.  No acute fracture is identified.  Visualized portions of the   lung apices are clear.                 Preliminary result by Katharina Earl MD (06/27/22 09:40:16)                Impression:    1. No evidence of acute osseous abnormality. 2. No plain film evidence of significant degenerative/discogenic disease.                       XR SHOULDER RIGHT (MIN 2 VIEWS) (Preliminary result)  Result time 06/27/22 09:44:21  Preliminary result by Katharina Earl MD (06/27/22 09:44:21)                Impression:    No evidence of acute osseous abnormality.              Narrative:    EXAMINATION:   TWO XRAY VIEWS OF THE RIGHT SHOULDER     6/27/2022 9:18 am     COMPARISON:   None     HISTORY:   ORDERING SYSTEM PROVIDED HISTORY: pain x3 months   TECHNOLOGIST PROVIDED HISTORY:   Reason for exam:->pain x3 months   Reason for Exam: pain x3 months   Additional signs and symptoms: pain x3 months   Relevant Medical/Surgical History: pain x3 months     FINDINGS:   No significant bone or joint abnormality is identified.  No evidence of acute   fracture or dislocation.  Right lung is clear.                 Preliminary result by Twila Martinez MD (06/27/22 09:42:42)                Impression:    No evidence of acute osseous abnormality.                     PROCEDURES   SPLINT PLACEMENT     A right wrist velcro brace was applied by the emergency department technician. The splint is in good position. The patient's extremity is neurovascularly intact after the splint placement. ED COURSE & MEDICAL DECISION MAKING       Vital signs and nursing notes reviewed during ED course. I have independently evaluated this patient . Supervising MD - Dr. Yuliya Ramos - present in the Emergency Department, available for consultation, throughout entirety of  patient care. All pertinent Lab data and radiographic results reviewed with patient at bedside. The patient and/or the family were informed of the results of any tests/labs/imaging, the treatment plan, and time was allotted to answer questions. Differential Diagnosis: Epidural Abscess,TAA, Metastases to back, Cord Compression, Spinal Fracture, Disk herniation, DDD    Clinical  IMPRESSION    1. Cervical radiculopathy    2. Acute pain of right shoulder        Patient presents for progressive worsening right-sided neck shoulder pain over the last 3 months following work injury. On exam, pleasant well-appearing 48-year-old male, afebrile in no acute respiratory distress. Reproducible tenderness of the anterolateral right shoulder and right paracervical musculature without palpable bony shoulder joints or midline bony cervical spine tenderness or step-offs.   Positive Spurling's on the right, negative on the left. Patient does have some noted weakness with  strength and wrist extension on the right compared to the left but otherwise neurovascular intact in the right upper extremity, negative wrist drop. Radial pulse 2+. Compartments soft throughout. Decreased range of motion of shoulder especially abduction external rotation secondary to pain with positive signs for shoulder impingement. Patient given IM Toradol and Norflex in the ED. x-ray cervical spine shows no significant degenerative disc disease or other acute osseous abnormality. X-ray of right shoulder also negative for evidence of acute fracture or traumatic subluxation. Given length of patient's symptoms, unclear exact etiology for pain. Discussed with patient possible cervical radiculopathy stemming from cervical process versus other peripheral neuropathy from other cubital/carpal tunnel syndrome. Also discussed she could have underlying shoulder pathology including rotator cuff injury versus tendinitis versus bursitis given his history of repetitive heavy lifting overhead movements. We discussed supportive symptomatic measures, work modifications and use of a wrist brace overnight while sleeping as patient symptoms appear worse in the morning. Do think he would benefit from outpatient follow-up with PCP as well as orthopedics to determine need for outpatient advanced imaging including MRI of cervical spine versus shoulder versus physical therapy versus need for EMG/nerve conduction study testing. As symptoms have been ongoing for last 3 months, low close patient for acute central nervous process including CVA/TIA, acute central cord compression. No other clinical risk factors, red flag history or exam findings concerning for cauda equina, epidural abscess/hematoma, meningitis/encephalitis.   Low clinical suspicion for ischemic limb, compartment syndrome, intra-articular joint infection/septic arthritis, DVT, osteomyelitis, arterial/neurologic injury, necrotizing fasciitis, or infected/rapidly expanding hematoma. Patient is discharged in stable condition. Educated on [address] therapy. Educated to avoid repetitive overhead lifting. Provided Velcro wrist brace prescriptions for muscle relaxer and Medrol Dosepak. Patient does not have PCP so I have given him/her doctor of the day contact information and encourage him/her to establish care and followup in the next 1-2 days. . Patient is instructed to followup with orthopedics in 7-10 days, sooner with worsened symptoms. Return precautions back to the ED discussed for any new or worsening symptoms. I recommend followup with primary care provider - call in a.m. Jamie Ra Diagnosis and plan discussed in detail with patient who understands and agrees. Patient agrees to return emergency department if symptoms worsen or any new symptoms develop. Comment: Please note this report has been produced using speech recognition software and may contain errors related to that system including errors in grammar, punctuation, and spelling, as well as words and phrases that may be inappropriate. If there are any questions or concerns please feel free to contact the dictating provider for clarification.           Rosa Elena Shelton PA-C  06/27/22 1127

## 2022-06-30 ENCOUNTER — OFFICE VISIT (OUTPATIENT)
Dept: ORTHOPEDIC SURGERY | Age: 27
End: 2022-06-30

## 2022-06-30 VITALS
RESPIRATION RATE: 16 BRPM | BODY MASS INDEX: 31.5 KG/M2 | HEIGHT: 71 IN | SYSTOLIC BLOOD PRESSURE: 127 MMHG | WEIGHT: 225 LBS | HEART RATE: 85 BPM | DIASTOLIC BLOOD PRESSURE: 69 MMHG

## 2022-06-30 DIAGNOSIS — G56.01 CARPAL TUNNEL SYNDROME OF RIGHT WRIST: Primary | ICD-10-CM

## 2022-06-30 PROCEDURE — 99203 OFFICE O/P NEW LOW 30 MIN: CPT

## 2022-06-30 NOTE — PATIENT INSTRUCTIONS
EMG ordered today for the right upper extremities   Call once EMG is completed     [address] MD  [address]   [address], [address] [facility],Third Floor  [phone]

## 2022-06-30 NOTE — PROGRESS NOTES
Nic Griffin is a 31 y.o. male presenting to the office complaining of pain in the right arm. He states that he has a hx of pain in his shoulder but states that he started having sharp shooting pain after mowing grass on Saturday. He states that he loss his has experienced loss of . He states that he is a  and has to use his arms and ands over his head frequently. He states that he has been treating the pain with ibuprofen with little relief. Impression   No evidence of acute osseous abnormality.

## 2022-07-08 ASSESSMENT — ENCOUNTER SYMPTOMS
COUGH: 0
BACK PAIN: 0
RHINORRHEA: 0
NAUSEA: 0
SHORTNESS OF BREATH: 0
FACIAL SWELLING: 0

## 2022-07-08 NOTE — PROGRESS NOTES
6/30/2022   Chief Complaint   Patient presents with    Shoulder Pain     right        History of Present Illness:                             Geno Steven is a 31 y.o. male presenting office today with a history of right arm pain that radiates into his elbow and shoulder. Patient states that he has pain also in his elbow when he bumps his elbow there is radiating pain down into his pinky finger. Patient states he has numbness associated with the pain into his hand. He states he is worse at night while he is sleeping. Patient has been treating his pain with ibuprofen with mild relief. Patient denies any acute injury or previous trauma to the right upper extremity. Master Montoya is a 31 y.o. male presenting to the office complaining of pain in the right arm. He states that he has a hx of pain in his shoulder but states that he started having sharp shooting pain after mowing grass on Saturday. He states that he loss his has experienced loss of . He states that he is a  and has to use his arms and ands over his head frequently. He states that he has been treating the pain with ibuprofen with little relief. Medical History  Patient's medications, allergies, past medical, surgical, social and family histories were reviewed and updated as appropriate. Review of Systems   Constitutional: Negative for fever. HENT: Negative for facial swelling and rhinorrhea. Respiratory: Negative for cough and shortness of breath. Cardiovascular: Negative for chest pain. Gastrointestinal: Negative for nausea. Musculoskeletal: Positive for arthralgias. Negative for back pain, gait problem, joint swelling, myalgias, neck pain and neck stiffness. Skin: Negative for pallor and rash. Neurological: Positive for numbness. Negative for facial asymmetry and speech difficulty. Psychiatric/Behavioral: Negative for agitation and confusion. Examination:  General Exam:  Vitals: /69   Pulse 85   Resp 16   Ht 5' 11\" (1.803 m)   Wt 225 lb (102.1 kg)   BMI 31.38 kg/m²    Physical Exam  Constitutional:       General: He is not in acute distress. Appearance: Normal appearance. He is not ill-appearing. HENT:      Head: Normocephalic and atraumatic. Nose: No rhinorrhea. Eyes:      General: No scleral icterus. Right eye: No discharge. Left eye: No discharge. Cardiovascular:      Pulses: Normal pulses. Pulmonary:      Effort: Pulmonary effort is normal. No respiratory distress. Breath sounds: No stridor. Musculoskeletal:      Right elbow: No swelling, deformity, effusion or lacerations. Normal range of motion. Tenderness present in medial epicondyle. Right forearm: Normal.      Right wrist: Normal.      Right hand: No swelling, deformity, lacerations, tenderness or bony tenderness. Normal range of motion. Normal strength. Decreased sensation of the ulnar distribution and median distribution. Normal capillary refill. Normal pulse. Cervical back: Normal range of motion. Comments: Right upper extremity exam: Patient right shoulder appears without swelling or decreases to range of motion or strength. Patient elbow maintains full active range of motion with positive Phalen sign at the elbow just proximal to the medial epicondyle. Patient maintains ability to supinate and pronate the forearm. Phalen sign positive at the wrist.  Tinel sign positive at the wrist.  Patient maintains full active range of motion at the wrist.  Patient has numbness throughout all finger pads of his fingers. Patient maintains full active range of motion and  strength 5 out of 5 of his hand and fingers. Radial pulse 2+, brisk capillary refill. Sensation to light touch intact throughout all surfaces of the right upper extremity. Skin:     General: Skin is warm.       Capillary Refill: Capillary refill takes less than 2 seconds. Coloration: Skin is not pale. Neurological:      General: No focal deficit present. Mental Status: He is alert and oriented to person, place, and time. Psychiatric:         Mood and Affect: Mood normal.         Behavior: Behavior normal.        Diagnostic testing:  X-rays reviewed in office, I independently reviewed the films in the office today:     No new imaging at today's visit. Office Procedures:  Orders Placed This Encounter   Procedures   Maryann Grider MD, Physical Medicine, [place]     Referral Priority:   Routine     Referral Type:   Eval and Treat     Referral Reason:   Specialty Services Required     Referred to Provider:   Abi Santacruz MD     Requested Specialty:   Physical Medicine and Rehab     Number of Visits Requested:   1       Assessment and Plan  1. Carpal tunnel syndrome, right hand    2. Possible cubital tunnel syndrome, right elbow    I explained the patient that the history and exam are consistent with carpal tunnel syndrome with a possible cubital tunnel syndrome as well. I explained that this is compression of the median nerve as it travels through the wrist and into the hand and the ulnar nerve as it travels through the elbow. Due to the chronicity, severity, and location of the symptoms, I have recommended that we proceed with EMG nerve conduction velocity testing to better characterize nerve compression in the upper extremity. A referral has been made for the EMG and the patient will follow-up here once it is complete to review the results. I have instructed the patient on activity modification and night splinting.         Electronically signed by Yesenia Munoz PA-C on 7/8/2022 at 8:02 AM

## 2022-08-12 ENCOUNTER — OFFICE VISIT (OUTPATIENT)
Dept: PHYSICAL MEDICINE AND REHAB | Age: 27
End: 2022-08-12

## 2022-08-12 VITALS — TEMPERATURE: 97.2 F

## 2022-08-12 DIAGNOSIS — M79.602 PARESTHESIA AND PAIN OF BOTH UPPER EXTREMITIES: ICD-10-CM

## 2022-08-12 DIAGNOSIS — G56.03 BILATERAL CARPAL TUNNEL SYNDROME: Primary | ICD-10-CM

## 2022-08-12 DIAGNOSIS — M79.601 PARESTHESIA AND PAIN OF BOTH UPPER EXTREMITIES: ICD-10-CM

## 2022-08-12 DIAGNOSIS — R20.2 PARESTHESIA AND PAIN OF BOTH UPPER EXTREMITIES: ICD-10-CM

## 2022-08-12 DIAGNOSIS — G56.21 ULNAR NEUROPATHY AT WRIST, RIGHT: ICD-10-CM

## 2022-08-12 PROCEDURE — 95911 NRV CNDJ TEST 9-10 STUDIES: CPT | Performed by: PHYSICAL MEDICINE & REHABILITATION

## 2022-08-12 PROCEDURE — 95886 MUSC TEST DONE W/N TEST COMP: CPT | Performed by: PHYSICAL MEDICINE & REHABILITATION

## 2022-08-12 NOTE — PROGRESS NOTES
EMG REPORT     CHIEF COMPLAINT: Painful cramping in both upper limbs with numbness and tingling involving the entire hands. HISTORY OF PRESENT ILLNESS: 31 y.o. right hand dominant male with progressive arthralgias, muscle pains and numbness and tingling in both upper limbs made worse with his activities as a . He has had intermittent neck pain but does not have any today. He feels stiff in the joints and occasionally drops things from his  with pain. The numbness and tingling involves all fingers and the wrist equally. He has not noted any rash or limb discoloration. He rated the symptom severity as 10/10. His sleep is negatively impacted with the symptoms. He has no history of diabetes or any thyroid disorder. PHYSICAL EXAMINATION: Alert. About 80 degrees of active cervical spine rotation of the left and 70 degrees to the right. Spurling's maneuver was uncomfortable but did not reproduce limb symptoms. Upper limb reflexes were trace and symmetric. Tinel's sign was negative. There was no atrophy, tremor or clonus present. There was no focal weakness with upper limb manual muscle testing. Sensation was intact to confrontation in both upper limbs. NERVE CONDUCTION STUDIES:     MOTOR         LATENCY NORMAL AMPLITUDE DISTANCE COND. [place]. RIGHT  MEDIAN 3.4 < 4.2 msec 9.5 8 cm 56   LEFT  MEDIAN 3.6 < 4.2 msec 5.7 8 cm >50   RIGHT  ULNAR 3.0 < 4.2 msec 5.3/5.1 8 cm 63/52   LEFT  ULNAR 2.8 < 4.2 msec 5.6 8 cm >50      SENSORY  ORTHODROMIC        LATENCY NORMAL AMPLITUDE DISTANCE   RIGHT MEDIAN 2.6 <2.3 msec 34 10 cm   LEFT  MEDIAN 2.4 < 2.3 msec 62 10 cm   RIGHT  ULNAR 2.2 < 2.3 msec 7 10 cm   LEFT  ULNAR 2.1 < 2.3 msec 20 10 cm       Right dorsal ulnar sensory: Absent.         NEEDLE EMG:      RIGHT   LEFT     Insertional Activity Spontaneous  Activity Volutional  MUAP's Insertional Activity Spontaneous  Activity Volutional  MUAP's   Cerv Parasp Normal None Normal      Infraspinatus Normal None Normal      Deltoid   Normal None Normal      Biceps   Normal None Normal      Triceps   Normal None Normal      Pronator Teres Normal None Normal      Extensor Indicis Normal None Normal      1st Dorsal Interosseus Normal None Normal      Abductor Pollicis Br. Normal None Normal          FINDINGS:   EMG of the cervical paraspinals and the right upper limb demonstrated no paraspinal nor limb muscle membrane irritability. Motor units were of normal configuration and recruitment throughout. Median sensory distal latencies were just outside of normal limits, particularly when compared to the ipsilateral ulnar values. Median motor nerve conductions were well-maintained. Ulnar nerve conductions were normal as well. The right dorsal ulnar sensory response was missing. IMPRESSION:      1. Mildly abnormal EMG. There are very subtle median neuropathies at each wrist (electrically negligible bilateral CTS). 2.  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 3.  No evidence of a focal cervical spinal nerve root lesion (radiculopathy), plexopathy, generalized neuropathy or primary muscle disease.          Thank you for this interesting referral.

## 2023-10-05 ENCOUNTER — HOSPITAL ENCOUNTER (EMERGENCY)
Age: 28
Discharge: HOME | End: 2023-10-05
Attending: STUDENT IN AN ORGANIZED HEALTH CARE EDUCATION/TRAINING PROGRAM
Payer: COMMERCIAL

## 2023-10-05 VITALS
SYSTOLIC BLOOD PRESSURE: 152 MMHG | RESPIRATION RATE: 16 BRPM | HEART RATE: 99 BPM | TEMPERATURE: 98.2 F | DIASTOLIC BLOOD PRESSURE: 95 MMHG | OXYGEN SATURATION: 98 %

## 2023-10-05 DIAGNOSIS — L05.91 PILONIDAL CYST: Primary | ICD-10-CM

## 2023-10-05 DIAGNOSIS — L03.317 CELLULITIS OF BUTTOCK: ICD-10-CM

## 2023-10-05 PROCEDURE — 6360000002 HC RX W HCPCS: Performed by: STUDENT IN AN ORGANIZED HEALTH CARE EDUCATION/TRAINING PROGRAM

## 2023-10-05 PROCEDURE — 96372 THER/PROPH/DIAG INJ SC/IM: CPT

## 2023-10-05 PROCEDURE — 6370000000 HC RX 637 (ALT 250 FOR IP): Performed by: STUDENT IN AN ORGANIZED HEALTH CARE EDUCATION/TRAINING PROGRAM

## 2023-10-05 PROCEDURE — 99284 EMERGENCY DEPT VISIT MOD MDM: CPT

## 2023-10-05 RX ORDER — NAPROXEN 500 MG/1
500 TABLET ORAL 2 TIMES DAILY PRN
Qty: 14 TABLET | Refills: 0 | Status: SHIPPED | OUTPATIENT
Start: 2023-10-05 | End: 2023-10-12

## 2023-10-05 RX ORDER — SULFAMETHOXAZOLE AND TRIMETHOPRIM 800; 160 MG/1; MG/1
1 TABLET ORAL 2 TIMES DAILY
Qty: 28 TABLET | Refills: 0 | Status: SHIPPED | OUTPATIENT
Start: 2023-10-05 | End: 2023-10-19

## 2023-10-05 RX ORDER — HYDROCODONE BITARTRATE AND ACETAMINOPHEN 5; 325 MG/1; MG/1
1 TABLET ORAL EVERY 6 HOURS PRN
Qty: 10 TABLET | Refills: 0 | Status: SHIPPED | OUTPATIENT
Start: 2023-10-05 | End: 2023-10-08

## 2023-10-05 RX ORDER — KETOROLAC TROMETHAMINE 30 MG/ML
15 INJECTION, SOLUTION INTRAMUSCULAR; INTRAVENOUS ONCE
Status: COMPLETED | OUTPATIENT
Start: 2023-10-05 | End: 2023-10-05

## 2023-10-05 RX ORDER — HYDROCODONE BITARTRATE AND ACETAMINOPHEN 5; 325 MG/1; MG/1
1 TABLET ORAL ONCE
Status: COMPLETED | OUTPATIENT
Start: 2023-10-05 | End: 2023-10-05

## 2023-10-05 RX ADMIN — KETOROLAC TROMETHAMINE 15 MG: 30 INJECTION, SOLUTION INTRAMUSCULAR at 13:37

## 2023-10-05 RX ADMIN — HYDROCODONE BITARTRATE AND ACETAMINOPHEN 1 TABLET: 5; 325 TABLET ORAL at 13:37

## 2023-10-05 ASSESSMENT — PAIN SCALES - GENERAL
PAINLEVEL_OUTOF10: 10
PAINLEVEL_OUTOF10: 7

## 2023-10-05 ASSESSMENT — PAIN DESCRIPTION - LOCATION
LOCATION: BUTTOCKS
LOCATION: BUTTOCKS

## 2023-10-05 ASSESSMENT — PAIN DESCRIPTION - ORIENTATION: ORIENTATION: LEFT

## 2023-10-05 ASSESSMENT — PAIN DESCRIPTION - FREQUENCY: FREQUENCY: CONTINUOUS

## 2023-10-05 NOTE — ED PROVIDER NOTES
Emergency Department Encounter        Pt Name: Nilsa Cedillo  MRN: [id number]  [address] [facility] 1995  Date of evaluation: 10/5/2023  ED Physician: Yefri Gonzales MD    [address]     Triage Chief Complaint:   Abscess (Abscess on left glute. States that it is painful. States he started to notice it a week ago)      HISTORY OF PRESENT ILLNESS & REVIEW OF SYSTEMS     History obtained from the patient. Nilsa Cedillo is a 33 y.o. male who presents to the emergency department for evaluation of concern for abscess. Patient says that for the past week or so he is having a swelling with significant pain to his buttocks. Says he had this before in the exact same location. Says that it is very painful when he sits on it and painful any moves. Says he has been doing warm baths to help with the pain. Denies any fevers or chills. Denies abdominal pain nausea vomiting diarrhea constipation. Denies any dysuria. Denies taking anything for the pain. Patient denies any new Headache, Fever, Chills, Cough, Chest pain, Shortness of breath, Abdominal pain, Nausea, Vomiting, Diarrhea, Constipation, and Leg swelling. The patient has no other acute complaints at this time. Review of systems as above. PAST MED/SURG/SOCIAL/FAM HISTORY & ALLERGY & MEDICATIONS     Past Medical History:   Diagnosis Date    Asthma     Hypospadias      Past Surgical History:   Procedure Laterality Date    HYPOSPADIAS CORRECTION       There is no problem list on file for this patient. History reviewed. No pertinent family history. No current facility-administered medications for this encounter.     Current Outpatient Medications:     sulfamethoxazole-trimethoprim (BACTRIM DS) 800-160 MG per tablet, Take 1 tablet by mouth 2 times daily for 14 days, Disp: 28 tablet, Rfl: 0    naproxen (NAPROSYN) 500 MG tablet, Take 1 tablet by mouth 2 times daily as needed for Pain, Disp: 14 tablet, Rfl: 0    HYDROcodone-acetaminophen

## 2023-10-05 NOTE — DISCHARGE INSTRUCTIONS
Take the full course of antibiotics  You can use Norco as needed for pain, be careful as it can make you drowsy so do not use an operating heavy machinery or driving  You can use naproxen as needed for pain, do not take with ibuprofen or other NSAIDs  Make sure to eat and drink plenty fluids to stay well-hydrated  Continue using your warm baths to help with your symptoms  Call and follow-up with general surgery regarding your symptoms  Call and follow-up with your family doctor within the next few days, you can use the resources below to find a family doctor  Return to the ED if your symptoms worsen or you feel you need to be reevaluated  Please go to [place] or call [phone] to find a new provider. Or use QR code below:                                                  Primary Care Physicians    [facility] Internal Medicine    Dr. Bruce Nieves. Anabel ACUNA  [facility] Internal Med  [address] [facility], [address] [place] [address]  [address] [id number] [facility] Internal Med  [address]  [phone]    [facility]    Yamile Anand MD  [facility] Internal Medicine [address] [facility] [address] [facility], [address] [place] [address]  [address] and [address]ri[address]. Jocy Welch MD  [address] [place] [address]. [facility] [address] [phone]    Jayashree Friend. [facility] CNP  [facility]  [facility]  [address]  [facility]  [phone]    Staci Casillas MD  [address] [place] [address]. [address]  [phone]     Karri Espinoza MD  [address] [place] [address]. [address]  [phone]    Aliza Dsouza PA-C  [address] [place] [address].   [address]  [phone]    Primary Care Providers [facility]    Dr. Ban Moralez MD  [facility]  [phone]    Dr. Alayna Henning MD   [facility]  [phone]    Primary Care Providers Lorena Mayen MD  [address] [place], [place]

## 2023-10-05 NOTE — ED NOTES
Discussed patient's prescriptions with patient. Discussed follow up with PCP. No further questions at this time. Ambulatory at discharge.         Maria R Lambert RN  10/05/23 [id number]

## 2024-07-11 ENCOUNTER — HOSPITAL ENCOUNTER (EMERGENCY)
Age: 29
Discharge: HOME | End: 2024-07-11

## 2024-07-11 VITALS
RESPIRATION RATE: 12 BRPM | BODY MASS INDEX: 37.77 KG/M2 | HEIGHT: 68 IN | HEART RATE: 82 BPM | WEIGHT: 249.2 LBS | SYSTOLIC BLOOD PRESSURE: 124 MMHG | TEMPERATURE: 98.4 F | OXYGEN SATURATION: 98 % | DIASTOLIC BLOOD PRESSURE: 64 MMHG

## 2024-07-11 DIAGNOSIS — L03.317 CELLULITIS OF BUTTOCK: Primary | ICD-10-CM

## 2024-07-11 PROCEDURE — 6370000000 HC RX 637 (ALT 250 FOR IP): Performed by: NURSE PRACTITIONER

## 2024-07-11 PROCEDURE — 99283 EMERGENCY DEPT VISIT LOW MDM: CPT

## 2024-07-11 PROCEDURE — 10060 I&D ABSCESS SIMPLE/SINGLE: CPT

## 2024-07-11 RX ORDER — CEPHALEXIN 500 MG/1
500 CAPSULE ORAL 3 TIMES DAILY
Qty: 21 CAPSULE | Refills: 0 | Status: SHIPPED | OUTPATIENT
Start: 2024-07-11 | End: 2024-07-18

## 2024-07-11 RX ORDER — LIDOCAINE HYDROCHLORIDE 20 MG/ML
10 INJECTION, SOLUTION INFILTRATION; PERINEURAL ONCE
Status: DISCONTINUED | OUTPATIENT
Start: 2024-07-11 | End: 2024-07-11 | Stop reason: HOSPADM

## 2024-07-11 RX ORDER — SULFAMETHOXAZOLE AND TRIMETHOPRIM 800; 160 MG/1; MG/1
1 TABLET ORAL 2 TIMES DAILY
Qty: 14 TABLET | Refills: 0 | Status: SHIPPED | OUTPATIENT
Start: 2024-07-11 | End: 2024-07-18

## 2024-07-11 RX ORDER — IBUPROFEN 600 MG/1
600 TABLET ORAL 3 TIMES DAILY PRN
Qty: 30 TABLET | Refills: 0 | Status: SHIPPED | OUTPATIENT
Start: 2024-07-11

## 2024-07-11 RX ORDER — CEPHALEXIN 250 MG/1
500 CAPSULE ORAL ONCE
Status: COMPLETED | OUTPATIENT
Start: 2024-07-11 | End: 2024-07-11

## 2024-07-11 RX ADMIN — CEPHALEXIN 500 MG: 250 CAPSULE ORAL at 15:24

## 2024-07-11 ASSESSMENT — ENCOUNTER SYMPTOMS
SHORTNESS OF BREATH: 0
ABDOMINAL PAIN: 0

## 2024-07-11 NOTE — ED PROVIDER NOTES
muscle atrophy is noted. No focal muscle deficits are appreciated. There is no edema, asymmetry, or calf / thigh tenderness bilaterally. No cyanosis. Bilateral upper and lower extremity ROM intact without pain or obvious deficit  Integument: Skin is warm and dry.  There is an area of erythema and induration on the left gluteal cleft.  There is an area of fluctuance noted.  It does not extend to the rectum.    Neurology:  Alert & oriented , No focal deficits noted.   Cranial nerves II through XII grossly intact.   Normal gross motor coordination & motor strength bilateral upper and lower extremities  Sensation intact. No evidence of incontinence or loss of bowel or bladder, no saddle anesthesia noted   Lymphatic: There is no submandibular or cervical adenopathy appreciated.  Psychiatric: Mentation is grossly normal cognition is grossly normal. Affect is appropriate    DIAGNOSTIC RESULTS     LABS:    Labs Reviewed - No data to display  I have reviewed and interpreted all of the currently available lab results from this visit (if applicable) Only abnormal lab results are displayed. All other labs were within normal range or not returned as of this dictation.    EKG:   When ordered, EKG's are interpreted by myself as well as the Emergency Department Physician in the absence of a cardiologist.  Please see their note for interpretation of EKG.    RADIOLOGY: Non-plain film images such as CT, Ultrasound and MRI are read by the radiologist. Jovanna OLIVA APRN - CNP have directly visualized the radiologic plain film image(s) with the below findings read by radiologist and agree with interpretation:    Interpretation perthe Radiologist below, if available at the time of this note:    No orders to display       PROCEDURES   Unless otherwise noted below, none     Incision/Drainage    Date/Time: 7/11/2024 3:19 PM    Performed by: Jovanna Kendall APRN - CNP  Authorized by: Jovanna Kendall APRN - CNP    Consent:

## 2025-01-10 ENCOUNTER — HOSPITAL ENCOUNTER (EMERGENCY)
Age: 30
Discharge: HOME | End: 2025-01-10
Attending: STUDENT IN AN ORGANIZED HEALTH CARE EDUCATION/TRAINING PROGRAM

## 2025-01-10 VITALS
SYSTOLIC BLOOD PRESSURE: 149 MMHG | HEART RATE: 101 BPM | OXYGEN SATURATION: 97 % | TEMPERATURE: 99 F | RESPIRATION RATE: 18 BRPM | DIASTOLIC BLOOD PRESSURE: 96 MMHG

## 2025-01-10 DIAGNOSIS — L05.91 INFECTED PILONIDAL CYST: Primary | ICD-10-CM

## 2025-01-10 PROCEDURE — 99283 EMERGENCY DEPT VISIT LOW MDM: CPT

## 2025-01-10 PROCEDURE — 6360000002 HC RX W HCPCS: Performed by: STUDENT IN AN ORGANIZED HEALTH CARE EDUCATION/TRAINING PROGRAM

## 2025-01-10 PROCEDURE — 6370000000 HC RX 637 (ALT 250 FOR IP): Performed by: STUDENT IN AN ORGANIZED HEALTH CARE EDUCATION/TRAINING PROGRAM

## 2025-01-10 PROCEDURE — 10080 I&D PILONIDAL CYST SIMPLE: CPT

## 2025-01-10 RX ORDER — LIDOCAINE HYDROCHLORIDE AND EPINEPHRINE BITARTRATE 20; .01 MG/ML; MG/ML
20 INJECTION, SOLUTION SUBCUTANEOUS ONCE
Status: COMPLETED | OUTPATIENT
Start: 2025-01-10 | End: 2025-01-10

## 2025-01-10 RX ORDER — DOXYCYCLINE HYCLATE 100 MG
100 TABLET ORAL 2 TIMES DAILY
Qty: 14 TABLET | Refills: 0 | Status: SHIPPED | OUTPATIENT
Start: 2025-01-10 | End: 2025-01-17

## 2025-01-10 RX ORDER — IBUPROFEN 400 MG/1
400 TABLET, FILM COATED ORAL EVERY 6 HOURS PRN
Qty: 14 TABLET | Refills: 0 | Status: SHIPPED | OUTPATIENT
Start: 2025-01-10

## 2025-01-10 RX ORDER — DOXYCYCLINE HYCLATE 100 MG
100 TABLET ORAL ONCE
Status: COMPLETED | OUTPATIENT
Start: 2025-01-10 | End: 2025-01-10

## 2025-01-10 RX ADMIN — LIDOCAINE HYDROCHLORIDE,EPINEPHRINE BITARTRATE 20 ML: 20; .01 INJECTION, SOLUTION INFILTRATION; PERINEURAL at 01:35

## 2025-01-10 RX ADMIN — DOXYCYCLINE HYCLATE 100 MG: 100 TABLET, COATED ORAL at 02:18

## 2025-01-10 ASSESSMENT — PAIN SCALES - GENERAL: PAINLEVEL_OUTOF10: 8

## 2025-01-10 ASSESSMENT — LIFESTYLE VARIABLES
HOW MANY STANDARD DRINKS CONTAINING ALCOHOL DO YOU HAVE ON A TYPICAL DAY: PATIENT DOES NOT DRINK
HOW OFTEN DO YOU HAVE A DRINK CONTAINING ALCOHOL: NEVER

## 2025-01-10 ASSESSMENT — PAIN DESCRIPTION - DESCRIPTORS: DESCRIPTORS: BURNING;DISCOMFORT

## 2025-01-10 ASSESSMENT — PAIN - FUNCTIONAL ASSESSMENT: PAIN_FUNCTIONAL_ASSESSMENT: 0-10

## 2025-01-10 ASSESSMENT — PAIN DESCRIPTION - LOCATION: LOCATION: BUTTOCKS

## 2025-01-10 NOTE — ED PROVIDER NOTES
administration in time range)       Discharge condition: stable    I am the Primary Clinician of Record.    Is this patient to be included in the SEP-1 Core Measure due to severe sepsis or septic shock?   No   Exclusion criteria - the patient is NOT to be included for SEP-1 Core Measure due to:  2+ SIRS criteria are not met        ROS - see HPI, below listed is current ROS at time of my eval:  systems reviewed and negative except as above.     Past Medical History:   Diagnosis Date    Asthma      No past surgical history on file.  No family history on file.  Social History     Socioeconomic History    Marital status: Unknown     Spouse name: Not on file    Number of children: Not on file    Years of education: Not on file    Highest education level: Not on file   Occupational History    Not on file   Tobacco Use    Smoking status: Every Day     Current packs/day: 2.00     Average packs/day: 2.0 packs/day for 11.0 years (22.0 ttl pk-yrs)     Types: Cigarettes    Smokeless tobacco: Never   Vaping Use    Vaping status: Former   Substance and Sexual Activity    Alcohol use: Yes     Alcohol/week: 4.0 standard drinks of alcohol     Types: 4 Cans of beer per week     Comment: per day     Drug use: Never    Sexual activity: Yes   Other Topics Concern    Not on file   Social History Narrative    Not on file     Social Determinants of Health     Financial Resource Strain: Not on file   Food Insecurity: Not on file   Transportation Needs: Not on file   Physical Activity: Not on file   Stress: Not on file   Social Connections: Not on file   Intimate Partner Violence: Not on file   Housing Stability: Not on file     Current Facility-Administered Medications   Medication Dose Route Frequency Provider Last Rate Last Admin    lidocaine-EPINEPHrine 2%-1:495959 injection 20 mL  20 mL IntraDERmal Once Felipe Simmons, DO        doxycycline hyclate (VIBRA-TABS) tablet 100 mg  100 mg Oral Once Felipe Simmons, DO         Current

## 2025-01-10 NOTE — ED NOTES
Discharged from ED with both verbal/typed instructions given, explained in detail of diagnosis, suggested follow up with PCP/Surgery and Podiatry as directed and use of all given RX, verbalized understanding of all instructions, no questions.  Ambulatory from ED without difficulty

## 2025-01-10 NOTE — PROCEDURES
PROCEDURE NOTE  Date: 1/10/2025   Name: James Womack  YOB: 1995    Procedures    I&D  Procedure: Incision and Drainage complex  The procedure was performed by myself.  Location: Left buttock, pilonidal cyst  Risks and benefits: Risks, benefits, and alternatives were discussed.  Questions were sought and answered, and verbal consent was obtained.  Anesthesia: Lidocaine with epinephrine  Procedure Description: probing and multiple incisions  Drainage Type/Amount: Small Bloody and Purulent   The patient tolerated the procedure well without complications.